# Patient Record
(demographics unavailable — no encounter records)

---

## 2025-03-12 NOTE — ASSESSMENT
[FreeTextEntry1] : The patient is a 54-year-old G1, P1 perimenopausal  female of [place] descent.  She underwent menarche at age 12 and had her first child at age 21.  She has never taken any hormone replacement therapy.  She has a family history with her paternal grandmother who had ovarian cancer at age 59 and paternal cousin had breast cancer at age 47.  The patient is in good health and had a routine mammography and ultrasound back in February 2021 which were negative.  She then felt a questionable density in the lateral left breast and was seen by her gynecologist and underwent diagnostic left breast mammography and ultrasound in August 2021 showing a probable complex cyst and ultrasound-guided core biopsy on September 18, 2021 showed LCIS a "Q" clip was placed in that region.  She then underwent an MRI on December 16, 2021 and was found to have extensive multinodular enhancement in the lateral left breast consistent with malignancy expanding over an area of 8.2 x 7.1 x 3.5 cm.  She then underwent MRI guided core biopsies of the left anterior location and posterior location both showing papillary ductal carcinoma in situ which was ER/[place]J positive between 91 and 100% with a "cork" and  "infinity" clip placed in those 2 regions.  She did undergo [facility] genetic panel testing in January 2022 which was negative.  The patient did bring in all her films on CD-ROM and had only very subtle findings on mammography with some faint areas of calcifications in the outer left breast.  The MRI was significant however with diffuse nodular enhancement throughout the lateral aspect of the left breast extending to just underneath the nipple.  The pathology was reviewed and showed a papillary DCIS which was felt to be fairly extensive given the findings on MRI. She understood the need for a mastectomy given the extent of her cancer and decided to come up to [place] for her surgery.  Slide review performed preoperatively was felt to have 1 mm invasive cancer and receptors run on the microinvasive component showed it to be ER/NJ positive HER-2/khris negative with a Ki-67 between 5 and 10%.  She underwent a left breast total mastectomy with sentinel lymph node biopsy and prepectoral expander reconstruction by Dr. Asif on March 8, 2022.  Final pathology just showed DCIS with no invasive cancer identified and the DCIS was papillary and micropapillary intermediate grade and she had 3 negative sentinel lymph nodes 1 with some isolated tumor cells.  Another lymph node was removed with the mastectomy specimen which was negative.  The DCIS spanned an area of 2 to 4 cm.  There was some DCIS less than a millimeter from the anterior superior margin but separate anterior margins were negative.  This should be considered a microinvasive stage IA breast cancer. She was seen by a medical oncologist in [place] who placed her on tamoxifen 20 mg a day.  There was no need for any radiation therapy in this case.  She underwent removal of her expander and placement of implant on July 6, 2022 by Dr. Asif.  She received physical therapy for left arm range of motion with good results.  On exam today, she has healed well from the left mastectomy sentinel lymph node biopsy and prepectoral expander reconstruction and has no evidence of recurrence and no suspicious findings in the right breast.  She underwent her last right breast mammography and ultrasound which was reviewed from October 22, 2024 and performed at [facility] in [place] which showed no suspicious findings. Since her cancer was found on MRI, I have been getting routine yearly MRIs and her last bilateral breast MRI performed at [facility] was reviewed from February 19, 2025 showing no evidence of malignancy.  Her next right breast mammography and ultrasound will be due again in October 2025 and her next MRI will be due in February 2026 she was given prescriptions.  I would like to see her again in another 6 months for routine follow-up. She will continue to follow-up with her medical oncologist, Dr. Milian, in [place] and remains on tamoxifen.  She did undergo an endometrial biopsy earlier in 2024 due to a thickened endometrial lining and the pathology was benign.

## 2025-03-12 NOTE — ADDENDUM
[FreeTextEntry1] : I spent greater than 75% the consultation in face-to-face counseling and coordination of care in this patient with a history of a left breast cancer who underwent a mastectomy and comes in now for routine breast cancer screening/surveillance.

## 2025-03-12 NOTE — HISTORY OF PRESENT ILLNESS
[FreeTextEntry1] : The patient is a 54-year-old G1, P1 perimenopausal  female of [place] descent.  She underwent menarche at age 12 and had her first child at age 21.  She has never taken any hormone replacement therapy.  She has a family history with her paternal grandmother who had ovarian cancer at age 59 and paternal cousin had breast cancer at age 47.  The patient is in good health and had a routine mammography and ultrasound back in February 2021 which were negative.  She then felt a questionable density in the lateral left breast and was seen by her gynecologist and underwent diagnostic left breast mammography and ultrasound in August 2021 showing a probable complex cyst and ultrasound-guided core biopsy on September 18, 2021 showed LCIS a "Q" clip was placed in that region.  She then underwent an MRI on December 16, 2021 and was found to have extensive multinodular enhancement in the lateral left breast consistent with malignancy expanding over an area of 8.2 x 7.1 x 3.5 cm.  She then underwent MRI guided core biopsies of the left anterior location and posterior location both showing papillary ductal carcinoma in situ which was ER/ID positive between 91 and 100% with a "cork" and  "infinity" clip placed in those 2 regions.  She underwent [facility] genetic panel testing in January 2022 which was negative.  She understood the need for a mastectomy given the extent of her cancer and decided to come up to [place] for her surgery.  Slide review performed preoperatively showed a microinvasive 1 mm focus of cancer and receptors on the microinvasive component showed her to be ER/ID positive and HER-2/khris negative with a Ki-67 between 5 and 10%.  She underwent a left breast total mastectomy with sentinel lymph node biopsy and prepectoral expander reconstruction by Dr. Asif on March 8, 2022.  Final pathology just showed DCIS with no invasive cancer identified and the DCIS was papillary and micropapillary intermediate grade and she had 3 negative sentinel lymph nodes 1 with some isolated tumor cells.  Another lymph node was removed with the mastectomy specimen which was negative.  The DCIS spanned an area of 2 to 4 cm.   There was some DCIS less than a millimeter from the anterior superior margin but separate anterior margins were negative.  This should be considered a microinvasive stage IA breast cancer.  She is following-up with a local medical oncologist in [place] and was placed on tamoxifen 20 mg a day.  She underwent removal of her expander and exchange for an implant on July 6, 2022.  She comes in now for routine follow-up and continues to get yearly right breast mammography and ultrasound.

## 2025-03-12 NOTE — PHYSICAL EXAM
[Normocephalic] : normocephalic [Atraumatic] : atraumatic [EOMI] : extra ocular movement intact [Supple] : supple [No Supraclavicular Adenopathy] : no supraclavicular adenopathy [No Cervical Adenopathy] : no cervical adenopathy [Examined in the supine and seated position] : examined in the supine and seated position [No dominant masses] : no dominant masses in right breast  [No dominant masses] : no dominant masses left breast [No Nipple Retraction] : no left nipple retraction [No Nipple Discharge] : no left nipple discharge [Breast Nipple Inversion Right] : nipple not inverted [Breast Nipple Retraction Right] : nipple not retracted [Breast Nipple Flattening Right] : nipple not flattened [Breast Nipple Fissures Right] : nipple not fissured [Breast Abnormal Lactation (Galactorrhea) Right] : no galactorrhea [Breast Abnormal Secretion Bloody Fluid Right] : no bloody discharge [Breast Abnormal Secretion Serous Fluid Right] : no serous discharge [Breast Abnormal Secretion Opalescent Fluid Right] : no milky discharge [Breast Mass Right Breast ___cm] : no masses [Breast Mass Left Breast ___cm] : no masses [___cm] : ~M [unfilled] ~Ucm upper outer quadrant mass [No Axillary Lymphadenopathy] : no left axillary lymphadenopathy [No Edema] : no edema [No Rashes] : no rashes [No Ulceration] : no ulceration [de-identified] : The patient has done well from her left breast total mastectomy and sentinel lymph node biopsy with prepectoral expander reconstruction.  She underwent exchange of her expander for an implant in July 2022 and has a good result.  She had a nipple reconstruction but no tattooing.  She has no evidence of recurrence over the left implant and no suspicious findings in the right breast.  She has no axillary, supraclavicular, or cervical adenopathy. [de-identified] : Status post left breast total mastectomy with sentinel lymph node biopsy and prepectoral expander reconstruction with later exchange of the expander for an implant in July 2022.  No evidence of recurrence.

## 2025-03-12 NOTE — REASON FOR VISIT
[Follow-Up: _____] : a [unfilled] follow-up visit [FreeTextEntry1] : The patient comes in with a strong family history of breast and ovarian cancer and was diagnosed with a left breast lower outer quadrant area of fairly extensive DCIS which presented as a palpable density and MRI showed fairly extensive region measuring 8.2 x 7.1 x 3.5 cm.  Genetic panel testing was negative and slide review did show likely invasive cancer and she underwent a left breast total mastectomy and sentinel lymph node biopsy with prepectoral expander reconstruction on March 8, 2022 and final pathology showed 4 negative sentinel lymph nodes with micropapillary DCIS and free margins.  This should be considered a microinvasive stage IA breast cancer.  She is following up with the medical oncologist in [place] and was placed on on tamoxifen.  She had exchange of her expander for an implant in July 2022.  She comes in now for routine follow-up. [FreeTextEntry2] : March 8, 2022

## 2025-04-17 NOTE — PHYSICAL EXAM
[Normocephalic] : normocephalic [Atraumatic] : atraumatic [EOMI] : extra ocular movement intact [Supple] : supple [No Supraclavicular Adenopathy] : no supraclavicular adenopathy [No Cervical Adenopathy] : no cervical adenopathy [Examined in the supine and seated position] : examined in the supine and seated position [No dominant masses] : no dominant masses in right breast  [No dominant masses] : no dominant masses left breast [No Nipple Retraction] : no left nipple retraction [No Nipple Discharge] : no left nipple discharge [Breast Mass Right Breast ___cm] : no masses [Breast Mass Left Breast ___cm] : no masses [___cm] : ~M [unfilled] ~Ucm upper outer quadrant mass [No Axillary Lymphadenopathy] : no left axillary lymphadenopathy [No Edema] : no edema [No Rashes] : no rashes [No Ulceration] : no ulceration [Breast Nipple Inversion Right] : nipple not inverted [Breast Nipple Retraction Right] : nipple not retracted [Breast Nipple Flattening Right] : nipple not flattened [Breast Abnormal Lactation (Galactorrhea) Right] : no galactorrhea [Breast Nipple Fissures Right] : nipple not fissured [Breast Abnormal Secretion Bloody Fluid Right] : no bloody discharge [Breast Abnormal Secretion Serous Fluid Right] : no serous discharge [Breast Abnormal Secretion Opalescent Fluid Right] : no milky discharge [de-identified] : Status post left breast total mastectomy with sentinel lymph node biopsy and prepectoral expander reconstruction with later exchange of the expander for an implant in July 2022.  No evidence of recurrence. [de-identified] : The patient has done well from her left breast total mastectomy and sentinel lymph node biopsy with prepectoral expander reconstruction.  She underwent exchange of her expander for an implant in July 2022 and has a good result.  She had a nipple reconstruction but no tattooing.  She has no evidence of recurrence over the left implant and no suspicious findings in the right breast.  She has no axillary, supraclavicular, or cervical adenopathy.

## 2025-04-17 NOTE — ASSESSMENT
[FreeTextEntry1] : The patient is a 54-year-old G1, P1 perimenopausal  female of [place] descent.  She underwent menarche at age 12 and had her first child at age 21.  She has never taken any hormone replacement therapy.  She has a family history with her paternal grandmother who had ovarian cancer at age 59 and paternal cousin had breast cancer at age 47.  The patient is in good health and had a routine mammography and ultrasound back in February 2021 which were negative.  She then felt a questionable density in the lateral left breast and was seen by her gynecologist and underwent diagnostic left breast mammography and ultrasound in August 2021 showing a probable complex cyst and ultrasound-guided core biopsy on September 18, 2021 showed LCIS a "Q" clip was placed in that region.  She then underwent an MRI on December 16, 2021 and was found to have extensive multinodular enhancement in the lateral left breast consistent with malignancy expanding over an area of 8.2 x 7.1 x 3.5 cm.  She then underwent MRI guided core biopsies of the left anterior location and posterior location both showing papillary ductal carcinoma in situ which was ER/SD positive between 91 and 100% with a "cork" and  "infinity" clip placed in those 2 regions.  She did undergo [facility] genetic panel testing in January 2022 which was negative.  The patient did bring in all her films on CD-ROM and had only very subtle findings on mammography with some faint areas of calcifications in the outer left breast.  The MRI was significant however with diffuse nodular enhancement throughout the lateral aspect of the left breast extending to just underneath the nipple.  The pathology was reviewed and showed a papillary DCIS which was felt to be fairly extensive given the findings on MRI. She understood the need for a mastectomy given the extent of her cancer and decided to come up to [place] for her surgery.  Slide review performed preoperatively was felt to have 1 mm invasive cancer and receptors run on the microinvasive component showed it to be ER/SD positive HER-2/khris negative with a Ki-67 between 5 and 10%.  She underwent a left breast total mastectomy with sentinel lymph node biopsy and prepectoral expander reconstruction by Dr. Asif on March 8, 2022.  Final pathology just showed DCIS with no invasive cancer identified and the DCIS was papillary and micropapillary intermediate grade and she had 3 negative sentinel lymph nodes 1 with some isolated tumor cells.  Another lymph node was removed with the mastectomy specimen which was negative.  The DCIS spanned an area of 2 to 4 cm.  There was some DCIS less than a millimeter from the anterior superior margin but separate anterior margins were negative.  This should be considered a microinvasive stage IA breast cancer. She was seen by a medical oncologist in [place] who placed her on tamoxifen 20 mg a day.  There was no need for any radiation therapy in this case.  She underwent removal of her expander and placement of implant on July 6, 2022 by Dr. Asif.  She received physical therapy for left arm range of motion with good results.  On exam today, she has healed well from the left mastectomy sentinel lymph node biopsy and prepectoral expander reconstruction and has no evidence of recurrence and no suspicious findings in the right breast.  She underwent her last right breast mammography and ultrasound which was reviewed from October 22, 2024 and performed at [facility] in [place] which showed no suspicious findings. Since her cancer was found on MRI, I have been getting routine yearly MRIs and her last bilateral breast MRI performed at [facility] was reviewed from February 19, 2025 showing no evidence of malignancy.  Her next right breast mammography and ultrasound will be due again in October 2025 and her next MRI will be due in February 2026 she was given prescriptions.  I would like to see her again in another 6 months for routine follow-up. She will continue to follow-up with a medical oncologist in [place] but she may be switching from Dr. Milian who she was previously seeing and she remains on tamoxifen.  She did undergo an endometrial biopsy earlier in 2024 due to a thickened endometrial lining and the pathology was benign.

## 2025-04-17 NOTE — ASSESSMENT
[FreeTextEntry1] : The patient is a 54-year-old G1, P1 perimenopausal  female of [place] descent.  She underwent menarche at age 12 and had her first child at age 21.  She has never taken any hormone replacement therapy.  She has a family history with her paternal grandmother who had ovarian cancer at age 59 and paternal cousin had breast cancer at age 47.  The patient is in good health and had a routine mammography and ultrasound back in February 2021 which were negative.  She then felt a questionable density in the lateral left breast and was seen by her gynecologist and underwent diagnostic left breast mammography and ultrasound in August 2021 showing a probable complex cyst and ultrasound-guided core biopsy on September 18, 2021 showed LCIS a "Q" clip was placed in that region.  She then underwent an MRI on December 16, 2021 and was found to have extensive multinodular enhancement in the lateral left breast consistent with malignancy expanding over an area of 8.2 x 7.1 x 3.5 cm.  She then underwent MRI guided core biopsies of the left anterior location and posterior location both showing papillary ductal carcinoma in situ which was ER/HI positive between 91 and 100% with a "cork" and  "infinity" clip placed in those 2 regions.  She did undergo [facility] genetic panel testing in January 2022 which was negative.  The patient did bring in all her films on CD-ROM and had only very subtle findings on mammography with some faint areas of calcifications in the outer left breast.  The MRI was significant however with diffuse nodular enhancement throughout the lateral aspect of the left breast extending to just underneath the nipple.  The pathology was reviewed and showed a papillary DCIS which was felt to be fairly extensive given the findings on MRI. She understood the need for a mastectomy given the extent of her cancer and decided to come up to [place] for her surgery.  Slide review performed preoperatively was felt to have 1 mm invasive cancer and receptors run on the microinvasive component showed it to be ER/HI positive HER-2/khris negative with a Ki-67 between 5 and 10%.  She underwent a left breast total mastectomy with sentinel lymph node biopsy and prepectoral expander reconstruction by Dr. Asif on March 8, 2022.  Final pathology just showed DCIS with no invasive cancer identified and the DCIS was papillary and micropapillary intermediate grade and she had 3 negative sentinel lymph nodes 1 with some isolated tumor cells.  Another lymph node was removed with the mastectomy specimen which was negative.  The DCIS spanned an area of 2 to 4 cm.  There was some DCIS less than a millimeter from the anterior superior margin but separate anterior margins were negative.  This should be considered a microinvasive stage IA breast cancer. She was seen by a medical oncologist in [place] who placed her on tamoxifen 20 mg a day.  There was no need for any radiation therapy in this case.  She underwent removal of her expander and placement of implant on July 6, 2022 by Dr. Asif.  She received physical therapy for left arm range of motion with good results.  On exam today, she has healed well from the left mastectomy sentinel lymph node biopsy and prepectoral expander reconstruction and has no evidence of recurrence and no suspicious findings in the right breast.  She underwent her last right breast mammography and ultrasound which was reviewed from October 22, 2024 and performed at [facility] in [place] which showed no suspicious findings. Since her cancer was found on MRI, I have been getting routine yearly MRIs and her last bilateral breast MRI performed at [facility] was reviewed from February 19, 2025 showing no evidence of malignancy.  Her next right breast mammography and ultrasound will be due again in October 2025 and her next MRI will be due in February 2026 she was given prescriptions.  I would like to see her again in another 6 months for routine follow-up. She will continue to follow-up with a medical oncologist in [place] but she may be switching from Dr. Milian who she was previously seeing and she remains on tamoxifen.  She did undergo an endometrial biopsy earlier in 2024 due to a thickened endometrial lining and the pathology was benign.

## 2025-04-17 NOTE — HISTORY OF PRESENT ILLNESS
[FreeTextEntry1] : The patient is a 54-year-old G1, P1 perimenopausal  female of [place] descent.  She underwent menarche at age 12 and had her first child at age 21.  She has never taken any hormone replacement therapy.  She has a family history with her paternal grandmother who had ovarian cancer at age 59 and paternal cousin had breast cancer at age 47.  The patient is in good health and had a routine mammography and ultrasound back in February 2021 which were negative.  She then felt a questionable density in the lateral left breast and was seen by her gynecologist and underwent diagnostic left breast mammography and ultrasound in August 2021 showing a probable complex cyst and ultrasound-guided core biopsy on September 18, 2021 showed LCIS a "Q" clip was placed in that region.  She then underwent an MRI on December 16, 2021 and was found to have extensive multinodular enhancement in the lateral left breast consistent with malignancy expanding over an area of 8.2 x 7.1 x 3.5 cm.  She then underwent MRI guided core biopsies of the left anterior location and posterior location both showing papillary ductal carcinoma in situ which was ER/ID positive between 91 and 100% with a "cork" and  "infinity" clip placed in those 2 regions.  She underwent [facility] genetic panel testing in January 2022 which was negative.  She understood the need for a mastectomy given the extent of her cancer and decided to come up to [place] for her surgery.  Slide review performed preoperatively showed a microinvasive 1 mm focus of cancer and receptors on the microinvasive component showed her to be ER/ID positive and HER-2/khris negative with a Ki-67 between 5 and 10%.  She underwent a left breast total mastectomy with sentinel lymph node biopsy and prepectoral expander reconstruction by Dr. Asif on March 8, 2022.  Final pathology just showed DCIS with no invasive cancer identified and the DCIS was papillary and micropapillary intermediate grade and she had 3 negative sentinel lymph nodes 1 with some isolated tumor cells.  Another lymph node was removed with the mastectomy specimen which was negative.  The DCIS spanned an area of 2 to 4 cm.   There was some DCIS less than a millimeter from the anterior superior margin but separate anterior margins were negative.  This should be considered a microinvasive stage IA breast cancer.  She is following-up with a local medical oncologist in [place] and was placed on tamoxifen 20 mg a day.  She underwent removal of her expander and exchange for an implant on July 6, 2022.  She comes in now for routine follow-up and continues to get yearly right breast mammography and ultrasound.

## 2025-04-17 NOTE — HISTORY OF PRESENT ILLNESS
[FreeTextEntry1] : The patient is a 54-year-old G1, P1 perimenopausal  female of [place] descent.  She underwent menarche at age 12 and had her first child at age 21.  She has never taken any hormone replacement therapy.  She has a family history with her paternal grandmother who had ovarian cancer at age 59 and paternal cousin had breast cancer at age 47.  The patient is in good health and had a routine mammography and ultrasound back in February 2021 which were negative.  She then felt a questionable density in the lateral left breast and was seen by her gynecologist and underwent diagnostic left breast mammography and ultrasound in August 2021 showing a probable complex cyst and ultrasound-guided core biopsy on September 18, 2021 showed LCIS a "Q" clip was placed in that region.  She then underwent an MRI on December 16, 2021 and was found to have extensive multinodular enhancement in the lateral left breast consistent with malignancy expanding over an area of 8.2 x 7.1 x 3.5 cm.  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  She underwent [facility] genetic panel testing in January 2022 which was negative.  She understood the need for a mastectomy given the extent of her cancer and decided to come up to [place] for her surgery.  Slide review performed preoperatively showed a microinvasive 1 mm focus of cancer and receptors on the microinvasive component showed her to be ER/IL positive and HER-2/khris negative with a Ki-67 between 5 and 10%.  She underwent a left breast total mastectomy with sentinel lymph node biopsy and prepectoral expander reconstruction by Dr. Asif on March 8, 2022.  Final pathology just showed DCIS with no invasive cancer identified and the DCIS was papillary and micropapillary intermediate grade and she had 3 negative sentinel lymph nodes 1 with some isolated tumor cells.  Another lymph node was removed with the mastectomy specimen which was negative.  The DCIS spanned an area of 2 to 4 cm.   There was some DCIS less than a millimeter from the anterior superior margin but separate anterior margins were negative.  This should be considered a microinvasive stage IA breast cancer.  She is following-up with a local medical oncologist in [place] and was placed on tamoxifen 20 mg a day.  She underwent removal of her expander and exchange for an implant on July 6, 2022.  She comes in now for routine follow-up and continues to get yearly right breast mammography and ultrasound.

## 2025-04-17 NOTE — PHYSICAL EXAM
[Normocephalic] : normocephalic [Atraumatic] : atraumatic [EOMI] : extra ocular movement intact [Supple] : supple [No Supraclavicular Adenopathy] : no supraclavicular adenopathy [No Cervical Adenopathy] : no cervical adenopathy [Examined in the supine and seated position] : examined in the supine and seated position [No dominant masses] : no dominant masses in right breast  [No dominant masses] : no dominant masses left breast [No Nipple Retraction] : no left nipple retraction [No Nipple Discharge] : no left nipple discharge [Breast Mass Right Breast ___cm] : no masses [Breast Mass Left Breast ___cm] : no masses [___cm] : ~M [unfilled] ~Ucm upper outer quadrant mass [No Axillary Lymphadenopathy] : no left axillary lymphadenopathy [No Edema] : no edema [No Rashes] : no rashes [No Ulceration] : no ulceration [Breast Nipple Inversion Right] : nipple not inverted [Breast Nipple Retraction Right] : nipple not retracted [Breast Nipple Flattening Right] : nipple not flattened [Breast Abnormal Lactation (Galactorrhea) Right] : no galactorrhea [Breast Nipple Fissures Right] : nipple not fissured [Breast Abnormal Secretion Bloody Fluid Right] : no bloody discharge [Breast Abnormal Secretion Serous Fluid Right] : no serous discharge [Breast Abnormal Secretion Opalescent Fluid Right] : no milky discharge [de-identified] : The patient has done well from her left breast total mastectomy and sentinel lymph node biopsy with prepectoral expander reconstruction.  She underwent exchange of her expander for an implant in July 2022 and has a good result.  She had a nipple reconstruction but no tattooing.  She has no evidence of recurrence over the left implant and no suspicious findings in the right breast.  She has no axillary, supraclavicular, or cervical adenopathy. [de-identified] : Status post left breast total mastectomy with sentinel lymph node biopsy and prepectoral expander reconstruction with later exchange of the expander for an implant in July 2022.  No evidence of recurrence.